# Patient Record
Sex: MALE | URBAN - METROPOLITAN AREA
[De-identification: names, ages, dates, MRNs, and addresses within clinical notes are randomized per-mention and may not be internally consistent; named-entity substitution may affect disease eponyms.]

---

## 2019-12-03 ENCOUNTER — OFFICE VISIT (OUTPATIENT)
Dept: URBAN - METROPOLITAN AREA CLINIC 45 | Facility: CLINIC | Age: 74
End: 2019-12-03
Payer: MEDICARE

## 2019-12-03 DIAGNOSIS — H40.1131 PRIMARY OPEN-ANGLE GLAUCOMA, BILATERAL, MILD STAGE: Primary | ICD-10-CM

## 2019-12-03 DIAGNOSIS — H25.813 COMBINED FORMS OF AGE-RELATED CATARACT, BILATERAL: ICD-10-CM

## 2019-12-03 PROCEDURE — 76514 ECHO EXAM OF EYE THICKNESS: CPT | Performed by: OPHTHALMOLOGY

## 2019-12-03 PROCEDURE — 92020 GONIOSCOPY: CPT | Performed by: OPHTHALMOLOGY

## 2019-12-03 PROCEDURE — 92133 CPTRZD OPH DX IMG PST SGM ON: CPT | Performed by: OPHTHALMOLOGY

## 2019-12-03 PROCEDURE — 92004 COMPRE OPH EXAM NEW PT 1/>: CPT | Performed by: OPHTHALMOLOGY

## 2019-12-03 ASSESSMENT — KERATOMETRY
OS: 42.63
OD: 42.63

## 2019-12-03 ASSESSMENT — INTRAOCULAR PRESSURE
OD: 21
OS: 15

## 2019-12-03 ASSESSMENT — VISUAL ACUITY: OD: 20/40

## 2019-12-03 NOTE — IMPRESSION/PLAN
Impression: Primary open-angle glaucoma, bilateral, mild stage: H40.1131. /532
12/19 OCT 76/97 7/9 Plan: Discussed diagnosis with patient. Discussed treatment options. Recommend OMNI OU (if unable due to insurance coverage, second choice iStent). Discussed glaucoma and cataracts. Discussed risks, benefits and alternatives to MIGS procedure. Pt voiced understanding and desires to proceed.

## 2019-12-03 NOTE — IMPRESSION/PLAN
Impression: Combined forms of age-related cataract, bilateral: H25.813. Plan: OK for ce/iol OD then OS w/ OMNI  in [place] [address] 25, RL2. Distance target. Discussed ORA. Discussed lens options, Toric/Trifocal. Pt is a candidate for premium lens. Discussed need for glasses for near vision with standard IOL and possibly Trifocal as well. Discussed diagnosis of cataracts. Cataracts are limiting vision. Discussed risks, benefits and alternatives to surgery including but not limited to: bleeding, infection, risk of vision loss, loss of the eye, need for other surgery. Patient voiced understanding and wishes to proceed.